# Patient Record
Sex: FEMALE | Race: WHITE | NOT HISPANIC OR LATINO | Employment: OTHER | URBAN - METROPOLITAN AREA
[De-identification: names, ages, dates, MRNs, and addresses within clinical notes are randomized per-mention and may not be internally consistent; named-entity substitution may affect disease eponyms.]

---

## 2019-09-04 ENCOUNTER — TRANSFERRED RECORDS (OUTPATIENT)
Dept: HEALTH INFORMATION MANAGEMENT | Facility: CLINIC | Age: 79
End: 2019-09-04

## 2019-09-09 LAB — EJECTION FRACTION: 70

## 2021-09-30 ENCOUNTER — TELEPHONE (OUTPATIENT)
Dept: OPHTHALMOLOGY | Facility: CLINIC | Age: 81
End: 2021-09-30

## 2021-09-30 ENCOUNTER — TRANSFERRED RECORDS (OUTPATIENT)
Dept: HEALTH INFORMATION MANAGEMENT | Facility: CLINIC | Age: 81
End: 2021-09-30

## 2021-09-30 ENCOUNTER — TRANSCRIBE ORDERS (OUTPATIENT)
Dept: OTHER | Age: 81
End: 2021-09-30

## 2021-09-30 DIAGNOSIS — H35.30 MACULAR DEGENERATION (SENILE) OF RETINA: ICD-10-CM

## 2021-09-30 DIAGNOSIS — D31.30 CHOROIDAL NEVUS: Primary | ICD-10-CM

## 2021-09-30 NOTE — TELEPHONE ENCOUNTER
Scheduled with Dr. Grajeda 10- in eye diagnostic time slot    Pt aware of date/time/location/duration    Note to  to mail out new patient packet    Chuckie Angeles RN 9:40 AM 10/01/21          [facility] Call Center    Phone Message    May a detailed message be left on voicemail: no     Reason for Call: Appointment Intake    Referring Provider Name: [facility], Dr. 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

## 2021-10-25 ENCOUNTER — OFFICE VISIT (OUTPATIENT)
Dept: OPHTHALMOLOGY | Facility: CLINIC | Age: 81
End: 2021-10-25
Attending: OPHTHALMOLOGY
Payer: MEDICARE

## 2021-10-25 DIAGNOSIS — H35.443 AGE-RELATED RETICULAR DEGENERATION OF RETINA, BILATERAL: ICD-10-CM

## 2021-10-25 DIAGNOSIS — D31.31 CHOROIDAL NEVUS OF RIGHT EYE: Primary | ICD-10-CM

## 2021-10-25 DIAGNOSIS — H53.9 VISION DISTURBANCE: Primary | ICD-10-CM

## 2021-10-25 DIAGNOSIS — D31.31 CHOROIDAL NEVUS OF RIGHT EYE: ICD-10-CM

## 2021-10-25 PROCEDURE — 99207 FUNDUS PHOTOS OU (BOTH EYES): CPT | Performed by: OPHTHALMOLOGY

## 2021-10-25 PROCEDURE — 92134 CPTRZ OPH DX IMG PST SGM RTA: CPT | Performed by: OPHTHALMOLOGY

## 2021-10-25 PROCEDURE — 76510 OPH US DX B-SCAN&QUAN A-SCAN: CPT | Performed by: OPHTHALMOLOGY

## 2021-10-25 PROCEDURE — 99203 OFFICE O/P NEW LOW 30 MIN: CPT | Performed by: OPHTHALMOLOGY

## 2021-10-25 PROCEDURE — 92250 FUNDUS PHOTOGRAPHY W/I&R: CPT | Performed by: OPHTHALMOLOGY

## 2021-10-25 PROCEDURE — G0463 HOSPITAL OUTPT CLINIC VISIT: HCPCS

## 2021-10-25 ASSESSMENT — TONOMETRY
OS_IOP_MMHG: 8
IOP_METHOD: ICARE
OD_IOP_MMHG: 8

## 2021-10-25 ASSESSMENT — VISUAL ACUITY
OD_PH_SC: 20/70
OD_SC: 20/100
OS_SC+: -2
METHOD: SNELLEN - LINEAR
OS_PH_SC: 20/70
OD_SC+: -1
OS_SC: 20/80
OS_PH_SC+: -1
OD_PH_SC+: +1

## 2021-10-25 ASSESSMENT — CONF VISUAL FIELD
METHOD: COUNTING FINGERS
OD_NORMAL: 1
OS_NORMAL: 1

## 2021-10-25 ASSESSMENT — CUP TO DISC RATIO
OS_RATIO: 0.3
OD_RATIO: 0.3

## 2021-10-25 ASSESSMENT — SLIT LAMP EXAM - LIDS
COMMENTS: NORMAL
COMMENTS: NORMAL

## 2021-10-25 ASSESSMENT — EXTERNAL EXAM - RIGHT EYE: OD_EXAM: NORMAL

## 2021-10-25 ASSESSMENT — EXTERNAL EXAM - LEFT EYE: OS_EXAM: NORMAL

## 2021-10-25 NOTE — PROGRESS NOTES
CC -   Subnormal vision    INTERVAL HISTORY - Initial visit with me    PMH -   Dianne Solis is a  81 year old year-old patient with history of poor vision OU.  Noted vision decline OU since ~ 2019, had good vision prior per patient especially after CE/IOL  H/o valve replacement  & surgery for hypertrophic cardiomyopathy  No CA   No nyctalopia, no photopsias      PAST OCULAR SURGERY  CE/IOL OU ~ 2017   YAG OU    RETINAL IMAGING:  OCT  10-25-21  OD - mild outer changes, tr ERM, PVD  OS - mild central changes outer retina, tr ERM, PVD    FAF 10-25-21  OU - mild irregular      ASSESSMENT & PLAN    # Subnormal VA OU   - ?etiology    - ONH pink/flat, RNFL looks good on OCT   - outer retinal mild irregular    - VA much worse than expected from OCT     - doubt AIR/CAR   - check mfERG next few months   - if normal refer to neuroopth vs cornea for eval      # mild dry AMD OU   - not sufficient to explain vision      # Choroidal nevus OD   - near ONH   - no other high risk features      # PVD OU          return to clinic: 1 month    ATTESTATION     Attending Physician Attestation:      Complete documentation of historical and exam elements from today's encounter can be found in the full encounter summary report (not reduplicated in this progress note).  I personally obtained the chief complaint(s) and history of present illness.  I confirmed and edited as necessary the review of systems, past medical/surgical history, family history, social history, and examination findings as documented by others; and I examined the patient myself.  I personally reviewed the relevant tests, images, and reports as documented above.  I formulated and edited as necessary the assessment and plan and discussed the findings and management plan with the patient and family    Deandra Grajeda MD, PhD  , Vitreoretinal Surgery  Department of Ophthalmology  [facility]

## 2021-10-25 NOTE — NURSING NOTE
Chief Complaints and History of Present Illnesses   Patient presents with     Consult For     Choroidal nevus of right eye     Chief Complaint(s) and History of Present Illness(es)     Consult For     Laterality: right eye    Onset: gradual    Onset: months ago    Course: stable    Associated symptoms: redness and tearing (LE).  Negative for eye pain, dryness, flashes, floaters, photophobia, glare and haloes    Treatments tried: no treatments    Pain scale: 0/10    Comments: Choroidal nevus of right eye              Comments     Pt states vision is stable since last exam 6 months ago.  LE continuous redness with some tearing.  No ocular medications.    SUNNY Burris October 25, 2021 11:08 AM

## 2021-10-25 NOTE — LETTER
10/25/2021       RE: Dianne Solis  [address]  [place] [address]     Dear Colleague,    Thank you for referring your patient, Dianne Solis, to the [facility] at [facility]. Please see a copy of my visit note below.    CC -   Subnormal vision    INTERVAL HISTORY - Initial visit with me    PMH -   Dianne Solis is a  81 year old year-old patient with history of poor vision OU.  Noted vision decline OU since ~ 2019, had good vision prior per patient especially after CE/IOL  H/o valve replacement  & surgery for hypertrophic cardiomyopathy  No CA   No nyctalopia, no photopsias      PAST OCULAR SURGERY  CE/IOL OU ~ 2017   YAG OU    RETINAL IMAGING:  OCT  10-25-21  OD - mild outer changes, tr ERM, PVD  OS - mild central changes outer retina, tr ERM, PVD    FAF 10-25-21  OU - mild irregular      ASSESSMENT & PLAN    # Subnormal VA OU   - ?etiology    - ONH pink/flat, RNFL looks good on OCT   - outer retinal mild irregular    - VA much worse than expected from OCT     - doubt AIR/CAR   - check mfERG next few months   - if normal refer to neuroopth vs cornea for eval      # mild dry AMD OU   - not sufficient to explain vision      # Choroidal nevus OD   - near ONH   - no other high risk features      # PVD OU    return to clinic: 1 month      ATTESTATION   Attending Attestation: Complete documentation of historical and exam elements from today's encounter can be found in the full encounter summary report (not reduplicated in this progress note).  I personally obtained the chief complaint(s) and history of present illness.  I confirmed and edited as necessary the review of systems, past medical/surgical history, family history, social history, and examination findings as documented by others; and I examined the patient myself.  I personally reviewed the relevant tests, images, and reports as documented above.  I formulated and edited as necessary the assessment and plan and  discussed the findings and management plan with the patient and family. Deandra Grajeda MD, PhD    Base Eye Exam     Visual Acuity (Snellen - Linear)       Right Left    Dist sc 20/100 -1 20/80 -2    Dist ph sc 20/70 +1 20/70 -1          Tonometry ([facility], 11:18 AM)       Right Left    Pressure 8 8          Pupils       Dark Light Shape React APD    Right 4 3 Round Brisk None    Left 4 3 Round Brisk None          Visual Fields (Counting fingers)       Left Right     Full Full          Extraocular Movement       Right Left     Full Full          Neuro/Psych     Oriented x3: Yes    Mood/Affect: Normal          Dilation     Both eyes: 1.0% Mydriacyl, 2.5% Dennis Synephrine @ 11:21 AM   Checked angles.  Open angles.             Slit Lamp and Fundus Exam     External Exam       Right Left    External Normal Normal          Slit Lamp Exam       Right Left    Lids/Lashes Normal Normal    Conjunctiva/Sclera White and quiet White and quiet    Cornea Clear Clear    Anterior Chamber Deep and quiet Deep and quiet    Iris Dilated Dilated    Lens PCIOL PCIOL    Vitreous PVD PVD          Fundus Exam       Right Left    Disc Normal Normal    C/D Ratio 0.3 0.3    Macula tr ERM, few small drusen tr ERM, few small drusen    Vessels Normal Normal    Periphery small flat pig nevus juxtapapillary ~ 1DD size sup to ONH Normal                Again, thank you for allowing me to participate in the care of your patient.      Sincerely,    Deandra Grajeda MD, PhD  , Vitreoretinal Surgery  Department of Ophthalmology & Visual Neurosciences  [facility]

## 2021-10-26 ENCOUNTER — TELEPHONE (OUTPATIENT)
Dept: OPHTHALMOLOGY | Facility: CLINIC | Age: 81
End: 2021-10-26

## 2021-11-22 ENCOUNTER — ALLIED HEALTH/NURSE VISIT (OUTPATIENT)
Dept: OPHTHALMOLOGY | Facility: CLINIC | Age: 81
End: 2021-11-22
Attending: OPHTHALMOLOGY
Payer: MEDICARE

## 2021-11-22 DIAGNOSIS — H53.9 VISION DISTURBANCE: ICD-10-CM

## 2021-11-22 DIAGNOSIS — D31.31 CHOROIDAL NEVUS OF RIGHT EYE: ICD-10-CM

## 2021-11-22 DIAGNOSIS — H35.443 AGE-RELATED RETICULAR DEGENERATION OF RETINA, BILATERAL: ICD-10-CM

## 2021-11-22 DIAGNOSIS — H35.443 AGE-RELATED RETICULAR DEGENERATION OF RETINA, BILATERAL: Primary | ICD-10-CM

## 2021-11-22 PROCEDURE — 92274 MULTIFOCAL ERG W/I&R: CPT

## 2021-11-22 PROCEDURE — 92014 COMPRE OPH EXAM EST PT 1/>: CPT | Mod: GC | Performed by: OPHTHALMOLOGY

## 2021-11-22 PROCEDURE — G0463 HOSPITAL OUTPT CLINIC VISIT: HCPCS | Mod: 25

## 2021-11-22 PROCEDURE — 999N000103 HC STATISTIC NO CHARGE FACILITY FEE

## 2021-11-22 ASSESSMENT — VISUAL ACUITY
OD_SC+: -1
OD_SC: 20/70
METHOD: SNELLEN - LINEAR
OS_SC: 20/60
METHOD: SNELLEN - LINEAR
OS_SC: 20/60
OD_SC: 20/70
OD_SC+: -1

## 2021-11-22 ASSESSMENT — EXTERNAL EXAM - LEFT EYE: OS_EXAM: NORMAL

## 2021-11-22 ASSESSMENT — TONOMETRY
IOP_METHOD: TONOPEN
OD_IOP_MMHG: 10
OS_IOP_MMHG: 11
OD_IOP_MMHG: 10
IOP_METHOD: TONOPEN
OS_IOP_MMHG: 11

## 2021-11-22 ASSESSMENT — CUP TO DISC RATIO
OS_RATIO: 0.3
OD_RATIO: 0.3

## 2021-11-22 ASSESSMENT — SLIT LAMP EXAM - LIDS
COMMENTS: NORMAL
COMMENTS: NORMAL

## 2021-11-22 ASSESSMENT — EXTERNAL EXAM - RIGHT EYE: OD_EXAM: NORMAL

## 2021-11-22 ASSESSMENT — CONF VISUAL FIELD
OS_NORMAL: 1
OD_NORMAL: 1

## 2021-11-22 NOTE — PROGRESS NOTES
Multifocal ERG Results    Diagnostic Indication: Subnormal vision OU    Diagnostic findings:  A 103 hexagon stimulus pattern was used to obtain a mfERG in both eyes.    For both eyes the test was noted to be reliable by the technician.  The waveform tracings showed good SNR.  The morphology was normal.   The amplitude plot showed reduced amplitudes and a blunted foveal peak.  The amplitude values were attenuated to less than 2-3 SD below normal centrally worse in the right eye than the left.  The latencies were irregular with some hexagons delayed  more than 2 SD above normal. .  The ring tracings were abnormal centrally.   The ring ratios were abnormal for ring amplitudes.  The interpretation of ring ratios is not well documented outside of the setting of plaquenil use, however.         Diagnostic impression:    1. Abnormal mfERG both eyes.  There is mild to moderate central depression worse in the right eye.      Clinical recommendations: clinical correlation recommended.

## 2021-11-22 NOTE — PROGRESS NOTES
CC -   Subnormal vision    INTERVAL HISTORY - She was last seen by me on 10/25/21. She notes stable vision since then. She denies new eye pain, flashes, and floaters.    PMH -   Dianne Solis is a  81 year old year-old patient with history of poor vision OU.  Noted vision decline OU since ~ 2019, had good vision prior per patient especially after CE/IOL  H/o valve replacement  & surgery for hypertrophic cardiomyopathy  No CA   No nyctalopia, no photopsias, no problems with color vision    FMH -   -parents, 1 brother and 1 sister, 2 children, and multiple grandchildren without eye problems    PAST OCULAR SURGERY  CE/IOL OU ~ 2017   YAG OU    RETINAL IMAGING:  OCT  10-25-21  OD - mild outer changes, tr ERM, PVD  OS - mild central changes outer retina, tr ERM, PVD    FAF 10-25-21  OU - mild irregular    mfERG 11-22-21  OD: central mild/mod depression  OS: central mild depression      ASSESSMENT & PLAN    # Subnormal VA OU   - ?etiology    - ONH pink/flat, RNFL looks good on OCT   - outer retinal mild irregular    - VA much worse than expected from OCT     - mfERG shows central mild/mod depression OU       - 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 Parrish for low vision refraction advised 11/2021   - low vision therapy advised 11/2021     - f/u with retina 6 months    # mild dry AMD OU   - not sufficient to explain vision      # Choroidal nevus OD   - near ONH   - no other high risk features   - stable today      # PVD OU    - RT/RD precautions d/w patient       RTC 6 months, OCT OU, DFE OU      Itzel Hawkins MD  Ophthalmology Resident, PGY-3      ATTESTATION     Attending Physician Attestation:      Complete documentation of historical and exam elements from today's encounter can be found in the full encounter summary report (not reduplicated in this progress note).  I personally obtained the chief  complaint(s) and history of present illness.  I confirmed and edited as necessary the review of systems, past medical/surgical history, family history, social history, and examination findings as documented by others; and I examined the patient myself.  I personally reviewed the relevant tests, images, and reports as documented above.  I personally reviewed the ophthalmic test(s) associated with this encounter, agree with the interpretation(s) as documented by the resident/fellow, and have edited the corresponding report(s) as necessary.   I formulated and edited as necessary the assessment and plan and discussed the findings and management plan with the patient and family    Deandra Grajeda MD, PhD  , Vitreoretinal Surgery  Department of Ophthalmology  [facility]

## 2021-11-22 NOTE — NURSING NOTE
Chief Complaints and History of Present Illnesses   Patient presents with     procedure mfERG     Chief Complaint(s) and History of Present Illness(es)     procedure mfERG               Comments     RIMA CARBALLO 10:50 AM November 22, 2021

## 2021-11-22 NOTE — NURSING NOTE
Chief Complaints and History of Present Illnesses   Patient presents with     Follow Up     Chief Complaint(s) and History of Present Illness(es)     Follow Up     Laterality: both eyes    Associated symptoms: Negative for eye pain, pain with eye movement, flashes and floaters    Treatments tried: no treatments    Response to treatment: no improvement    Pain scale: 0/10              Comments     1 month follow up   Pt reports no vision change since last visit  Deirdre CABRALLO 10:48 AM November 22, 2021

## 2021-11-23 ENCOUNTER — TELEPHONE (OUTPATIENT)
Dept: OPHTHALMOLOGY | Facility: CLINIC | Age: 81
End: 2021-11-23
Payer: MEDICARE

## 2021-11-24 ENCOUNTER — TELEPHONE (OUTPATIENT)
Dept: OPHTHALMOLOGY | Facility: CLINIC | Age: 81
End: 2021-11-24
Payer: MEDICARE

## 2021-11-24 NOTE — TELEPHONE ENCOUNTER
Patient returned VM regarding scheduling for New Low Vision Appointment with . Patient declined scheduling and will call in the Spring if interested.-Per Patient

## 2021-12-21 ENCOUNTER — TELEPHONE (OUTPATIENT)
Dept: OPHTHALMOLOGY | Facility: CLINIC | Age: 81
End: 2021-12-21
Payer: MEDICARE

## 2022-01-26 ENCOUNTER — TELEPHONE (OUTPATIENT)
Dept: OPHTHALMOLOGY | Facility: CLINIC | Age: 82
End: 2022-01-26
Payer: MEDICARE

## 2022-01-26 NOTE — TELEPHONE ENCOUNTER
[facility] Call Center    Phone Message    May a detailed message be left on voicemail: yes     Reason for Call: Symptoms or Concerns     If patient has red-flag symptoms, warm transfer to triage line    Current symptom or concern: Patient needs to reschedule the ERG that is scheduled for 2/21 please contact her back          Action Taken: Other: Eye    Travel Screening: Not Applicable

## 2022-03-24 ENCOUNTER — TELEPHONE (OUTPATIENT)
Dept: OPHTHALMOLOGY | Facility: CLINIC | Age: 82
End: 2022-03-24
Payer: MEDICARE

## 2022-03-24 NOTE — TELEPHONE ENCOUNTER
3/24/2022 10:46AM Dianne HUSAIN stating she would like to reschedule the canceled testing appointment prior to her 5/23 appointment with Dr. Grajeda.

## 2022-05-05 ENCOUNTER — ALLIED HEALTH/NURSE VISIT (OUTPATIENT)
Dept: OPHTHALMOLOGY | Facility: CLINIC | Age: 82
End: 2022-05-05
Attending: OPHTHALMOLOGY
Payer: MEDICARE

## 2022-05-05 DIAGNOSIS — H35.443 AGE-RELATED RETICULAR DEGENERATION OF RETINA, BILATERAL: ICD-10-CM

## 2022-05-05 PROCEDURE — 999N000103 HC STATISTIC NO CHARGE FACILITY FEE

## 2022-05-05 PROCEDURE — 92273 FULL FIELD ERG W/I&R: CPT

## 2022-05-05 ASSESSMENT — VISUAL ACUITY
METHOD: SNELLEN - LINEAR
OS_SC+: +1
OD_SC+: -1
OS_SC: 20/70
OD_SC: 20/70

## 2022-05-05 NOTE — PROGRESS NOTES
STANDARD ffERG REPORT    Referring:  BENJY Grajeda MD    RE:  Dianne Solis  MRN:  [id number]  :  1940    ffERG Date:  2022      DIAGNOSTIC INDICATION:  The patient is a 81 year old woman with a history of unexplained vision loss OU, especially since 2019.  A mfERG showed central depression and so a ffERG was obtained to evaluate for occult macular dystrophy or other dystrophies.          DIAGNOSTIC FINDINGS:    A full field ERG was obtained in both eye using DTL electrodes and ISCEV standards.  Per the technician, the recording was mostly reliable, though there were difficulties with blinking during the 30Hz flicker     Under scotopic conditions, both eyes had normal response to the dim scotopic flash. However, for both the 3.0 and 10.0 bright scotopic flashes, both eyes showed mild reduction of the A-wave amplitude and moderate increased implicit times for the B-waves about 5 ms above normal.  The B-wave amplitudes were normal, though at the low end of the normal range. Oscillatory potentials were normal.     Under photopic conditions, the ffERG was markedly abnormal. In particular, the 30Hz flicker had moderately attenduated amplitudes in both eyes. In the right eye, the implicit time was moderately delayed, though it was normal in the left. However, the technician noted blink artifact, and so this was repeated with the RETEval system. This demonstrated moderately reduced amplitudes and moderately increased implicit times in both eyes.  The morphology of the single photopic flash response was normal in each eye, though the waveforms were clearly attenuated.                   Visual Acuity Right Eye : 20/70-1, PHNI      w/o gls, +IOL    Visual Acuity Left Eye : 20/70+1, PHNI      w/o gls, +IOL                                                     ALL AVERAGED             Data for Full-Field ERG  Right Eye  Left Eye   DARK-ADAPTED Patient Normal Patient   0.01 ERG (sandeep) b-wave amplitude ( v) 136*  52.5 to 336.5 142*   0.01 ERG (sandeep) b-wave implicit time (ms) 97.6* 81.7 to 125.9 94.9*         3.0 ERG (combined) a-wave amplitude ( v) -81 -263.9 to -88.5 -82   3.0 ERG (combined) a-wave implicit time (ms) 17.8 9.7 to 25.1 17.8   3.0 ERG (combined) b-wave amplitude ( v) 178 167 to 465.4 174   3.0 ERG (combined) b-wave implicit time (ms) 59.9 44.8 to 55.4 59.9         10.0 ERG (brighter combined) a-wave amplitude ( v) -105 -233.5 to -135.5 -107   10.0 ERG (brighter combined) a-wave implicit time (ms) 13.3 11.2 to 15.2 12.8   10.0 ERG (brighter combined) b-wave amplitude ( v) 188 108.3 to 443.9 177   10.0 ERG (brighter combined) b-wave implicit time (ms) 59.4 35.7 to 53.1 58.8         3.0 Oscillatory Potentials + present +   LIGHT-ADAPTED       3.0 Flicker (30Hz) amplitude ( v) 19(18) 55.8 to 159.6 21(23)   3.0 Flicker (30Hz) implicit time (ms) 36.6(102.1) 23.5 to 36.1 34.9(101.5)         3.0 ERG (cone) a-wave amplitude ( v) -7 -44.3 to -14.7 -12   3.0 ERG (cone) a-wave implicit time (ms) 17.2 11.4 to 16.4 16.1   3.0 ERG (cone) b-wave amplitude ( v) 20 60.8 to 149.2 30   3.0 ERG (cone) b-wave implicit time (ms) 36.6 26.3 to 35.7 34.9   * = manipulated cursors  parentheses = cursors at selected peaks  ---- = residual to non-measurable  xxxx = not tested                  STANDARD RETeval ERG REPORT,  ISCEV Photopic Flash/Flicker and PhNR cd   --RETeval w/Sensor Strip = 1/3 Espion3 w/DTL                                                 ALL AVERAGED  Data for Full-Field ERG Right Eye   Left Eye    Dark-Adapted Patient Normal  Patient   0.01 ERG (sandeep) amplitude( v) xxxx 21 to 79 xxxx   0.01 ERG (sandeep) implicit time(ms) xxxx 68 to 103 xxxx   MMMMM      3.0 ERG (combined) a-wave amplitude( v) xxxx -62 to -22 xxxx   3.0 ERG (combined) a-wave implicit time(ms) xxxx 13 to 18 xxxx   3.0 ERG (combined) b-wave amplitude( v) xxxx 42 to 86 xxxx   3.0 ERG (combined) b-wave implicit time(ms) xxxx 35 to 52 xxxx   MMMMM      10.0 ERG  (brighter) a-wave amplitude( v) xxxx -77 to -31 xxxx   10.0 ERG (brighter) a-wave implicit time(ms) xxxx 10 to 13 xxxx   10.0 ERG (brighter) b-wave amplitude( v) xxxx 54 to 95 xxxx   10.0 ERG (brighter) b-wave implicit time(ms) xxxx 35 to 53 xxxx         3.0 Oscillatory Potentials xxxx present xxxx    Light-Adapted       3.0 Flicker (30-Hz) amplitude( v) 5.2 14.5 to 51.6 11.4   3.0 Flicker (30-Hz) implicit time(ms) 33.5 25.1 to 30.0 35.4         3.0 ERG (cone) a-wave amplitude( v) -2.4 -15.7 to -1.8 -4.0   3.0 ERG (cone) a-wave implicit time(ms) 13.4 10.7 to 14.9 13.5   3.0 ERG (cone) b-wave amplitude( v) 6.3 14.8 to 62.4 11.2   3.0 ERG (cone) b-wave implicit time(ms) 32.8 28.0 to 33.3 33.5                                     ---- = residual to non-measurable                      xxxx = not tested                        Normative values per  Evaluation of light- and dark-adapted ERGs using a mydriasis-free,                                          portable system: clinical classifications and normative data  by H Indio, X Brant, S Lauren, SN Tracy,                                      M Osvaldo, KUMAR Dela Cruz, ANNA MARIE Campbell ,KUMAR Major, WILLAM Maravilla,   Ophthalmology and Vision Sciences,                                      [facility], [facility]. [email or web address]                                       Normative values per [facility] data per individual age at time of testing, cd (dilated) and Td (undilated).              INTERPRETATION:       1) Abnormal ffERG OU.  The scotopic responses have non-specific, mild abnormalities, but the photopic responses are markedly abnormal in each eye with moderate attenuation and delay.  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  AIR/CAR cannot be ruled out, but the pattern of abnormalities and history make this less likely.          Sincerely,     Deandra Grajeda MD, PhD

## 2022-05-05 NOTE — NURSING NOTE
Returns for ffERG.  Last eye exam w/Dr. Grajeda 11-22-21:  Subnormal vision both eyes, ?etiology.  S/P CE/IOL both eyes ~2017.  mfERG done 11-22-21 shows central mild/moderate depression both eyes.  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  Pt states S/P open heart sx x2, now w/pacemaker.  States rx gls will not help va at this time; otc readers not much help.  Only able to watch tv (55in screen) and increases font on cell phone.  Scheduled to RTN to [facility] 05-23-22; will await results.

## 2022-05-18 DIAGNOSIS — H35.443 AGE-RELATED RETICULAR DEGENERATION OF RETINA, BILATERAL: Primary | ICD-10-CM

## 2022-05-27 ENCOUNTER — OFFICE VISIT (OUTPATIENT)
Dept: OPHTHALMOLOGY | Facility: CLINIC | Age: 82
End: 2022-05-27
Attending: OPHTHALMOLOGY
Payer: MEDICARE

## 2022-05-27 DIAGNOSIS — H35.443 AGE-RELATED RETICULAR DEGENERATION OF RETINA, BILATERAL: Primary | ICD-10-CM

## 2022-05-27 PROCEDURE — 99213 OFFICE O/P EST LOW 20 MIN: CPT | Mod: GC | Performed by: OPHTHALMOLOGY

## 2022-05-27 PROCEDURE — 92134 CPTRZ OPH DX IMG PST SGM RTA: CPT | Performed by: OPHTHALMOLOGY

## 2022-05-27 PROCEDURE — G0463 HOSPITAL OUTPT CLINIC VISIT: HCPCS | Mod: 25

## 2022-05-27 RX ORDER — METOPROLOL TARTRATE 25 MG/1
1 TABLET, FILM COATED ORAL 2 TIMES DAILY
COMMUNITY
Start: 2022-03-15

## 2022-05-27 RX ORDER — AMOXICILLIN 500 MG/1
CAPSULE ORAL
COMMUNITY
Start: 2022-01-18

## 2022-05-27 RX ORDER — ALLOPURINOL 300 MG/1
1 TABLET ORAL DAILY
COMMUNITY
Start: 2022-05-14

## 2022-05-27 RX ORDER — WARFARIN SODIUM 1 MG/1
TABLET ORAL
COMMUNITY
Start: 2021-12-28

## 2022-05-27 RX ORDER — ATORVASTATIN CALCIUM 40 MG/1
40 TABLET, FILM COATED ORAL DAILY
COMMUNITY
Start: 2022-05-01

## 2022-05-27 RX ORDER — TORSEMIDE 20 MG/1
20 TABLET ORAL DAILY
COMMUNITY
Start: 2022-04-08

## 2022-05-27 RX ORDER — AMLODIPINE BESYLATE 5 MG/1
5 TABLET ORAL DAILY
COMMUNITY
Start: 2022-02-07

## 2022-05-27 RX ORDER — POTASSIUM CHLORIDE 750 MG/1
1 TABLET, EXTENDED RELEASE ORAL DAILY
COMMUNITY
Start: 2022-05-14

## 2022-05-27 RX ORDER — IRBESARTAN 150 MG/1
2 TABLET ORAL DAILY
COMMUNITY
Start: 2022-02-22

## 2022-05-27 ASSESSMENT — VISUAL ACUITY
OD_PH_SC+: +2
OS_PH_SC+: -2/+1
OS_PH_SC: 20/70
OD_SC: 20/60
OD_SC+: -1
OD_PH_SC: 20/60
OS_SC: 20/100
METHOD: SNELLEN - LINEAR
OS_SC+: -1

## 2022-05-27 ASSESSMENT — CONF VISUAL FIELD
METHOD: COUNTING FINGERS
OD_NORMAL: 1
OS_NORMAL: 1

## 2022-05-27 ASSESSMENT — CUP TO DISC RATIO
OD_RATIO: 0.3
OS_RATIO: 0.3

## 2022-05-27 ASSESSMENT — TONOMETRY
IOP_METHOD: TONOPEN
OS_IOP_MMHG: 14
OD_IOP_MMHG: 14

## 2022-05-27 ASSESSMENT — EXTERNAL EXAM - RIGHT EYE: OD_EXAM: NORMAL

## 2022-05-27 ASSESSMENT — EXTERNAL EXAM - LEFT EYE: OS_EXAM: NORMAL

## 2022-05-27 ASSESSMENT — SLIT LAMP EXAM - LIDS
COMMENTS: NORMAL
COMMENTS: NORMAL

## 2022-05-27 NOTE — NURSING NOTE
Chief Complaints and History of Present Illnesses   Patient presents with     Macular Degeneration Follow Up     Chief Complaint(s) and History of Present Illness(es)     Macular Degeneration Follow Up     Quality: blurred vision    Course: stable    Associated symptoms: Negative for eye pain, redness, tearing, flashes, floaters and discharge    Pain scale: 0/10              Comments     Pt states no VA changes. She denies ocular discomfort, watering, new flashes or floaters.  She says she has no new concerns    Tad requested to be at this visit per last 11/2021 exam note.  Mari Shelton, COT COT 9:26 AM 05/27/2022                      .

## 2022-05-27 NOTE — NURSING NOTE
Chief Complaints and History of Present Illnesses   Patient presents with     Macular Degeneration Follow Up     Chief Complaint(s) and History of Present Illness(es)     Macular Degeneration Follow Up     Quality: blurred vision    Course: stable    Associated symptoms: Negative for eye pain, redness, tearing, flashes, floaters and discharge    Pain scale: 0/10              Comments     Pt states no VA changes. She denies ocular discomfort, watering, new flashes or floaters.  She says she has no new concerns  Mari Shelton, SUNNY COT 9:01 AM 05/27/2022

## 2022-05-27 NOTE — PROGRESS NOTES
CC -   Subnormal vision    INTERVAL HISTORY - No changes, did not see low vision or Francois since DEXTER with me        PMH - Dianne Solis is a  81 year old  patient with history of poor vision OU.  Noted vision decline OU since ~ 2019, had good vision prior per patient especially after CE/IOL  H/o valve replacement  & surgery for hypertrophic cardiomyopathy  No CA   No nyctalopia, no photopsias    FMH -   -parents, 1 brother and 1 sister, 2 children, and multiple grandchildren without eye problems      PAST OCULAR SURGERY  CE/IOL OU ~ 2017   YAG OU    RETINAL IMAGING:  OCT  5/27/22  OD - mild outer changes, tr ERM, PVD stable  OS - mild central changes outer retina, tr ERM, PVD stable    FAF 10-25-21  OU - mild irregular    mfERG 11-22-21  OD: central mild/mod depression  OS: central mild depression    ffERG 5/5/22  1) Abnormal ffERG OU.  The scotopic responses have non-specific, mild abnormalities, but the photopic responses are markedly abnormal in each eye with moderate attenuation and delay.  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  AIR/CAR cannot be ruled out, but the pattern of abnormalities and history make this less likely.      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 Parrish for low vision refraction advised 5/2022              - low vision therapy advised 5/2022     - refer to [facility] for IRD eval   - consider anti-recoverin testing       # mild dry AMD OU              - not sufficient to explain vision        # Choroidal nevus OD              - near ONH               - no other high risk features              - stable today        # PVD OU               - RT/RD precautions d/w patient           return to clinic: SM next available    Deisy Lu MD  Ophthalmology Resident, PGY-3  [facility]       ATTESTATION     Attending Physician Attestation:      Complete documentation of historical and exam elements from today's encounter can be found in the full encounter summary report (not reduplicated in this progress note).  I personally obtained the chief complaint(s) and history of present illness.  I confirmed and edited as necessary the review of systems, past medical/surgical history, family history, social history, and examination findings as documented by others; and I examined the patient myself.  I personally reviewed the relevant tests, images, and reports as documented above.  I personally reviewed the ophthalmic test(s) associated with this encounter, agree with the interpretation(s) as documented by the resident/fellow, and have edited the corresponding report(s) as necessary.   I formulated and edited as necessary the assessment and plan and discussed the findings and management plan with the patient and family    Deandra Grajeda MD, PhD  , Vitreoretinal Surgery  Department of Ophthalmology  [facility]

## 2022-06-02 ENCOUNTER — LAB (OUTPATIENT)
Dept: LAB | Facility: CLINIC | Age: 82
End: 2022-06-02
Payer: MEDICARE

## 2022-06-02 DIAGNOSIS — H35.443 AGE-RELATED RETICULAR DEGENERATION OF RETINA, BILATERAL: ICD-10-CM

## 2022-06-02 LAB
Lab: NORMAL
PERFORMING LABORATORY: NORMAL
SPECIMEN STATUS: NORMAL
TEST NAME: NORMAL

## 2022-06-02 PROCEDURE — 84182 PROTEIN WESTERN BLOT TEST: CPT

## 2022-06-02 PROCEDURE — 36415 COLL VENOUS BLD VENIPUNCTURE: CPT

## 2022-06-02 PROCEDURE — 84999 UNLISTED CHEMISTRY PROCEDURE: CPT

## 2022-06-06 LAB — MAYO MISC RESULT: NORMAL

## 2022-07-26 ENCOUNTER — TELEPHONE (OUTPATIENT)
Dept: OPHTHALMOLOGY | Facility: CLINIC | Age: 82
End: 2022-07-26

## 2022-07-26 NOTE — TELEPHONE ENCOUNTER
Spoke with patient to inform her that Dr. Parrish will not be able to see her on Thursday for her appointment at [facility]. She will call back to reschedule.     Maria E Arzate

## 2022-07-26 NOTE — TELEPHONE ENCOUNTER
Called and spoke to Dianne     She was very upset her appointment was canceled on 7/28     Made her an appointment for 7/27 with Dr. Parrish for new Low Vision     Provided the [address] [facility] - Address     Tatianna Pompa Communication Facilitator on 7/26/2022 at 10:34 AM

## 2022-07-26 NOTE — TELEPHONE ENCOUNTER
[facility] Call Center    Phone Message    May a detailed message be left on voicemail: yes     Reason for Call: Other: Pt calling back to reschedule Dr Parrish Appointment and I offered next available and she got very upset and demanded a call back from clinic  To get her in before 9/15, Please call pt back to discuss, she said she can't wait that long    Thank you,    Action Taken: Message routed to:  [facility] eye    Travel Screening: Not Applicable

## 2022-07-26 NOTE — TELEPHONE ENCOUNTER
Per Call Center    Pt calling back to reschedule Dr Parrish Appointment and I offered next available and she got very upset and demanded a call back from clinic  To get her in before 9/15, Please call pt back to discuss, she said she can't wait that long    Are we able to offer a sooner appointment ( before 9/15)?    Thanks,     Tatianna

## 2022-07-27 ENCOUNTER — OFFICE VISIT (OUTPATIENT)
Dept: OPHTHALMOLOGY | Facility: CLINIC | Age: 82
End: 2022-07-27
Payer: MEDICARE

## 2022-07-27 DIAGNOSIS — H35.53 CONE DYSTROPHY: Primary | ICD-10-CM

## 2022-07-27 DIAGNOSIS — H52.4 PRESBYOPIA: ICD-10-CM

## 2022-07-27 PROCEDURE — 99203 OFFICE O/P NEW LOW 30 MIN: CPT | Performed by: OPTOMETRIST

## 2022-07-27 ASSESSMENT — REFRACTION_MANIFEST
OS_SPHERE: PLANO
OS_AXIS: 090
OD_SPHERE: PLANO
OS_CYLINDER: +0.50

## 2022-07-27 ASSESSMENT — CUP TO DISC RATIO
OS_RATIO: 0.3
OD_RATIO: 0.3

## 2022-07-27 ASSESSMENT — TONOMETRY
OS_IOP_MMHG: 8
IOP_METHOD: ICARE
OD_IOP_MMHG: 8

## 2022-07-27 ASSESSMENT — VISUAL ACUITY
OS_SC+: -2
OD_SC+: -2
METHOD: SNELLEN - LINEAR
OS_PH_SC: 20/60
OS_SC: 20/60
OD_PH_SC: 20/60
OD_SC: 20/70

## 2022-07-27 ASSESSMENT — SLIT LAMP EXAM - LIDS
COMMENTS: NORMAL
COMMENTS: NORMAL

## 2022-07-27 ASSESSMENT — EXTERNAL EXAM - RIGHT EYE: OD_EXAM: NORMAL

## 2022-07-27 ASSESSMENT — EXTERNAL EXAM - LEFT EYE: OS_EXAM: NORMAL

## 2022-07-27 ASSESSMENT — CONF VISUAL FIELD
OD_NORMAL: 1
OS_NORMAL: 1
METHOD: COUNTING FINGERS

## 2022-07-27 NOTE — PROGRESS NOTES
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 Recommended patient to continue following up with retina provider to obtain a more definitive diagnosis. Patient may be benefit from a low vision OT evaluation to discuss additional visual aids- 3X illuminated handheld magnifier or using an iPad for reading may be helpful to start. Call or return to clinic with additional questions or concerns.     (H52.4) Presbyopia  Comment: Minimal distance Rx noted today without visual improvement  Plan: Ok to continue with OTC readers.           40 minutes were spent on the date of the encounter doing chart review, history and exam, documentation, and further activities as noted above.    Complete documentation of historical and exam elements from today's encounter can  be found in the full encounter summary report (not reduplicated in this progress  note). I personally obtained the chief complaint(s) and history of present illness. I  confirmed and edited as necessary the review of systems, past medical/surgical  history, family history, social history, and examination findings as documented by  others; and I examined the patient myself. I personally reviewed the relevant tests,  images, and reports as documented above. I formulated and edited as necessary the  assessment and plan and discussed the findings and management plan with the  patient and family.    Momo Parrish OD

## 2022-07-27 NOTE — NURSING NOTE
Chief Complaints and History of Present Illnesses   Patient presents with     Consult For     New Pt here for Low Vision consult.     Chief Complaint(s) and History of Present Illness(es)     Consult For     Laterality: both eyes    Onset: gradual    Onset: years ago    Course: stable    Associated symptoms: Negative for dryness, eye pain, tearing, photophobia, flashes and floaters    Treatments tried: no treatments    Pain scale: 0/10    Comments: New Pt here for Low Vision consult.              Comments     Pt states vision is stable since last visit.  No ocular medications.  No pain or other concerns at this time.    SUNNY Burris July 27, 2022 7:28 AM

## 2022-09-07 DIAGNOSIS — H35.53 CONE DYSTROPHY: Primary | ICD-10-CM

## 2022-09-10 NOTE — PROGRESS NOTES
CC -  New retinal dystrophy consult     INTERVAL HISTORY - Initial visit with me. Referred by Dr. Taras QUINONEZ   Dianne Solis is a 82 year old female patient with history of subacute progressive vision loss each eye. She was seen by Dr. Grajeda who worked it up; ffERG 05/2022 consistent with cone dystrophy. She saw Dr Carballo for low vision. She is here to establish care.     Family history  Maternal grandparents  Paternal grandparents  Parents  Siblings  Children    PMHx:  Past Medical History:   Diagnosis Date     Hypertension         Retinal Dystrophy assessment:  Nyctalopia: ***  Problems going from outside bright light to inside: ***   Problems going from inside to bright light outside: ***   Photosensitivity: ***  Problems with steps, curbs, or stairs: ***  Problems with color vision: ***  Sees flashing lights: ***     PAST OCULAR SURGERY   ***Denies     RETINAL IMAGING:  OCT September 9, 2022  - Right eye: CST***, PHF attached, normal foveal contour  - Left eye: CST***, PHF attached, normal foveal contour    FAF September 9, 2022  - Right eye: ***ring of hypoFAF spots  - Left eye: ***ring of hypoFAF spots    Optos color September 9, 2022  Per exam    mfERG November 22, 2021  RE: central mild/mod depression  LE: central mild depression    ffERG May 05, 2022  1) Abnormal ffERG OU.  The scotopic responses have non-specific, mild abnormalities, but the photopic responses are markedly abnormal in each eye with moderate attenuation and delay.  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  AIR/CAR cannot be ruled out, but the pattern of abnormalities and history make this less likely.    Goldmann visual field September 9, 2022  - Right eye:  - Left eye:     ASSESSMENT & PLAN  # Retinal dystrophy  - DDx: occular macular dystrophy, less likely AIR and CAR  -     - recommend gene testing- invitae genetic testing done today        Return to clinic: ***      "    ~~~~~~~~~~~~~~~~~~~~~~~~~~~~~~~~~~  Ricky Galdamez MD  Resident Physician, PGY-3  Department of Ophthalmology      {ddk - Eye Attestation Options [id number]::\" Complete documentation of historical and exam elements from today's encounter can be found in the full encounter summary report (not reduplicated in this progress note).  I personally obtained the chief complaint(s) and history of present illness.  I confirmed and edited as necessary the review of systems, past medical/surgical history, family history, social history, and examination findings as documented by others; and I examined the patient myself.  I personally reviewed the relevant tests, images, and reports as documented above.  I formulated and edited as necessary the assessment and plan and discussed the findings and management plan with the patient and family\"}    Elinor Laird MD  Professor of Ophthalmology  Vitreo-Retinal surgeon   Department of Ophthalmology and Visual Neurosciences   [facility]  Phone: [phone]   Fax: [phone]     "

## 2022-09-12 ENCOUNTER — OFFICE VISIT (OUTPATIENT)
Dept: OPHTHALMOLOGY | Facility: CLINIC | Age: 82
End: 2022-09-12
Attending: OPHTHALMOLOGY
Payer: MEDICARE

## 2022-09-12 DIAGNOSIS — H35.53 CONE DYSTROPHY: Primary | ICD-10-CM

## 2022-09-12 PROCEDURE — G0463 HOSPITAL OUTPT CLINIC VISIT: HCPCS | Mod: 25

## 2022-09-12 ASSESSMENT — VISUAL ACUITY
OS_SC: 20/125
METHOD: SNELLEN - LINEAR
OD_PH_SC: 20/80
OD_SC: 20/100
OS_PH_SC: 20/80

## 2022-09-12 ASSESSMENT — TONOMETRY
OS_IOP_MMHG: 15
IOP_METHOD: TONOPEN
OD_IOP_MMHG: 15

## 2022-09-12 ASSESSMENT — CONF VISUAL FIELD
OS_NORMAL: 1
METHOD: COUNTING FINGERS

## 2022-09-12 NOTE — NURSING NOTE
Chief Complaints and History of Present Illnesses   Patient presents with     Macular Degeneration Follow Up     Chief Complaint(s) and History of Present Illness(es)     Macular Degeneration Follow Up     Laterality: both eyes    Onset: gradual    Onset: months ago    Quality: States va is the same since last visit      Associated symptoms: Negative for photophobia, flashes and floaters    Treatments tried: no treatments    Pain scale: 0/10              Comments     Pastora CORREA 7:25 AM September 12, 2022

## 2022-09-12 NOTE — PROGRESS NOTES
Patient left before seeing doctor. Was not made aware that appointment would take so long or involve any imaging.     LEORA JEAN 11:47 AM September 12, 2022

## 2022-09-13 ENCOUNTER — TELEPHONE (OUTPATIENT)
Dept: OPHTHALMOLOGY | Facility: CLINIC | Age: 82
End: 2022-09-13

## 2024-08-22 ENCOUNTER — TRANSCRIBE ORDERS (OUTPATIENT)
Dept: OTHER | Age: 84
End: 2024-08-22

## 2024-08-22 DIAGNOSIS — H54.7 LOW VISION: Primary | ICD-10-CM
